# Patient Record
Sex: FEMALE | Race: WHITE | URBAN - METROPOLITAN AREA
[De-identification: names, ages, dates, MRNs, and addresses within clinical notes are randomized per-mention and may not be internally consistent; named-entity substitution may affect disease eponyms.]

---

## 2023-07-03 ENCOUNTER — OFFICE VISIT (OUTPATIENT)
Facility: LOCATION | Age: 83
End: 2023-07-03
Payer: MEDICARE

## 2023-07-03 DIAGNOSIS — H35.3131 NONEXUDATIVE AGE-RELATED MACULAR DEGENERATION BILATERAL EARL: ICD-10-CM

## 2023-07-03 DIAGNOSIS — H26.493 OTHER SECONDARY CATARACT, BILATERAL: ICD-10-CM

## 2023-07-03 DIAGNOSIS — E11.9 TYPE 2 DIABETES MELLITUS WITHOUT COMPLICATIONS: Primary | ICD-10-CM

## 2023-07-03 PROCEDURE — 99213 OFFICE O/P EST LOW 20 MIN: CPT | Performed by: OPTOMETRIST

## 2023-07-03 ASSESSMENT — INTRAOCULAR PRESSURE
OS: 13
OD: 12

## 2023-07-03 ASSESSMENT — VISUAL ACUITY
OS: 20/30
OD: 20/30

## 2023-07-03 NOTE — IMPRESSION/PLAN
Impression: 1+ PCO OU noted in today's exam. Patient not currently bothered. Plan: Patient would like to proceed with YAG PC laser. RTC for YAG eval OU with Dr. Alonso Cain.

## 2023-07-03 NOTE — IMPRESSION/PLAN
Impression: Type 2 diabetes mellitus without complications: H92.5. No ocular complications. (+) oral medications. BSL: 141 (last night) A1C: 6.9 No diabetic retinopathy noted at this time. Discussed with patient to keep all follow ups with PCP. Plan: Advised patient to monitor and keep A1C and BSL levels under control. Patient to continue care with primary care physician. Will continue to monitor with annual dilation.

## 2023-07-03 NOTE — IMPRESSION/PLAN
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 Plan: Patient to continue follow ups with Dr. Wendy Hernández at [facility].